# Patient Record
Sex: MALE | Race: WHITE | Employment: OTHER | URBAN - METROPOLITAN AREA
[De-identification: names, ages, dates, MRNs, and addresses within clinical notes are randomized per-mention and may not be internally consistent; named-entity substitution may affect disease eponyms.]

---

## 2018-02-16 PROBLEM — S13.100A CERVICAL SUBLUXATION: Status: ACTIVE | Noted: 2018-02-16

## 2020-08-25 ENCOUNTER — APPOINTMENT (OUTPATIENT)
Dept: GENERAL RADIOLOGY | Age: 70
End: 2020-08-25
Payer: MEDICARE

## 2020-08-25 ENCOUNTER — HOSPITAL ENCOUNTER (EMERGENCY)
Age: 70
Discharge: HOME | End: 2020-08-26
Attending: EMERGENCY MEDICINE
Payer: MEDICARE

## 2020-08-25 PROCEDURE — 6370000000 HC RX 637 (ALT 250 FOR IP): Performed by: PHYSICIAN ASSISTANT

## 2020-08-25 PROCEDURE — 73110 X-RAY EXAM OF WRIST: CPT

## 2020-08-25 PROCEDURE — 29125 APPL SHORT ARM SPLINT STATIC: CPT

## 2020-08-25 PROCEDURE — 99284 EMERGENCY DEPT VISIT MOD MDM: CPT

## 2020-08-25 RX ORDER — BUPIVACAINE HYDROCHLORIDE 2.5 MG/ML
10 INJECTION, SOLUTION EPIDURAL; INFILTRATION; INTRACAUDAL ONCE
Status: DISCONTINUED | OUTPATIENT
Start: 2020-08-25 | End: 2020-08-26 | Stop reason: HOSPADM

## 2020-08-25 RX ORDER — HYDROCODONE BITARTRATE AND ACETAMINOPHEN 5; 325 MG/1; MG/1
1 TABLET ORAL ONCE
Status: COMPLETED | OUTPATIENT
Start: 2020-08-25 | End: 2020-08-26

## 2020-08-25 RX ORDER — OXYCODONE HYDROCHLORIDE AND ACETAMINOPHEN 5; 325 MG/1; MG/1
1 TABLET ORAL EVERY 6 HOURS PRN
Qty: 14 TABLET | Refills: 0 | Status: SHIPPED | OUTPATIENT
Start: 2020-08-25 | End: 2020-08-26 | Stop reason: SDUPTHER

## 2020-08-25 RX ORDER — LIDOCAINE HYDROCHLORIDE 10 MG/ML
20 INJECTION, SOLUTION INFILTRATION; PERINEURAL ONCE
Status: DISCONTINUED | OUTPATIENT
Start: 2020-08-25 | End: 2020-08-26 | Stop reason: HOSPADM

## 2020-08-25 RX ORDER — HYDROCODONE BITARTRATE AND ACETAMINOPHEN 5; 325 MG/1; MG/1
1 TABLET ORAL ONCE
Status: COMPLETED | OUTPATIENT
Start: 2020-08-25 | End: 2020-08-25

## 2020-08-25 RX ADMIN — HYDROCODONE BITARTRATE AND ACETAMINOPHEN 1 TABLET: 5; 325 TABLET ORAL at 20:50

## 2020-08-25 ASSESSMENT — PAIN DESCRIPTION - ORIENTATION: ORIENTATION: LEFT

## 2020-08-25 ASSESSMENT — PAIN DESCRIPTION - LOCATION: LOCATION: ARM;WRIST

## 2020-08-25 ASSESSMENT — PAIN SCALES - GENERAL
PAINLEVEL_OUTOF10: 5
PAINLEVEL_OUTOF10: 8

## 2020-08-25 ASSESSMENT — PAIN DESCRIPTION - FREQUENCY: FREQUENCY: CONTINUOUS

## 2020-08-25 ASSESSMENT — PAIN DESCRIPTION - PAIN TYPE: TYPE: ACUTE PAIN

## 2020-08-26 VITALS
HEART RATE: 62 BPM | WEIGHT: 223 LBS | HEIGHT: 75 IN | TEMPERATURE: 98.1 F | BODY MASS INDEX: 27.73 KG/M2 | SYSTOLIC BLOOD PRESSURE: 116 MMHG | OXYGEN SATURATION: 95 % | RESPIRATION RATE: 16 BRPM | DIASTOLIC BLOOD PRESSURE: 69 MMHG

## 2020-08-26 PROCEDURE — 6370000000 HC RX 637 (ALT 250 FOR IP): Performed by: PHYSICIAN ASSISTANT

## 2020-08-26 PROCEDURE — 6370000000 HC RX 637 (ALT 250 FOR IP): Performed by: EMERGENCY MEDICINE

## 2020-08-26 RX ORDER — OXYCODONE HYDROCHLORIDE AND ACETAMINOPHEN 5; 325 MG/1; MG/1
1 TABLET ORAL EVERY 6 HOURS PRN
Qty: 14 TABLET | Refills: 0 | Status: SHIPPED | OUTPATIENT
Start: 2020-08-26 | End: 2020-08-31

## 2020-08-26 RX ORDER — OXYCODONE HYDROCHLORIDE AND ACETAMINOPHEN 5; 325 MG/1; MG/1
1 TABLET ORAL ONCE
Status: COMPLETED | OUTPATIENT
Start: 2020-08-26 | End: 2020-08-26

## 2020-08-26 RX ADMIN — HYDROCODONE BITARTRATE AND ACETAMINOPHEN 1 TABLET: 5; 325 TABLET ORAL at 00:58

## 2020-08-26 RX ADMIN — OXYCODONE HYDROCHLORIDE AND ACETAMINOPHEN 1 TABLET: 5; 325 TABLET ORAL at 07:00

## 2020-08-26 ASSESSMENT — PAIN SCALES - GENERAL
PAINLEVEL_OUTOF10: 6
PAINLEVEL_OUTOF10: 5
PAINLEVEL_OUTOF10: 5

## 2020-08-26 NOTE — ED PROVIDER NOTES
ED Attending  CC: No  HPI:  8/25/20, Time: 8:37 PM EDT         Mariah Hammonds is a 72 y.o. male presenting to the ED for left wrist injury, beginning prior to arrival .  The complaint has been persistent, moderate in severity, and worsened by the men of his wrist he was playing baseball when that he ran into another player, fell hitting his wrist with his head wrist hyperextending and he felt a snap at the wrist.  Patient with limited range of motion present. He denies  numbness tingling or weakness. No head injury no loss of consciousness no neck pain. Review of Systems:   Pertinent positives and negatives are stated within HPI, all other systems reviewed and are negative.          --------------------------------------------- PAST HISTORY ---------------------------------------------  Past Medical History:  has a past medical history of Blood transfusion, Chronic back pain, Hyperlipidemia, and Osteoarthritis. Past Surgical History:  has a past surgical history that includes knee surgery (back in 80 ); Heel spur surgery; Tonsillectomy; Knee Arthroplasty ([id number]); back surgery; Neck surgery; lumbar fusion; and Hand surgery. Social History:  reports that he has never smoked. He has never used smokeless tobacco. He reports current alcohol use of about 3.0 standard drinks of alcohol per week. Family History: family history includes Arthritis in his father and mother; Cancer in his mother. The patients home medications have been reviewed. Allergies: Tetanus toxoids    -------------------------------------------------- RESULTS -------------------------------------------------  All laboratory and radiology results have been personally reviewed by myself   LABS:  No results found for this visit on 08/25/20.     RADIOLOGY:  Interpreted by Radiologist.  XR WRIST LEFT (MIN 3 VIEWS)   Final Result   Improved in the alignment of the lung fragments of the   impacted fracture of the distal left radius following reduction. XR WRIST LEFT (MIN 3 VIEWS)   Final Result   Interval fracture reduction of the distal radius, with   significant residual displacement and dorsal angulation      XR WRIST LEFT (MIN 3 VIEWS)   Final Result      Comminuted fracture of the distal radius, posteriorly angulated, with   extension into the radioulnar joint and possibly radiocarpal joint.          ------------------------- NURSING NOTES AND VITALS REVIEWED ---------------------------   The nursing notes within the ED encounter and vital signs as below have been reviewed. /79   Pulse 96   Temp 98.3 °F (36.8 °C) (Oral)   Resp 16   Ht 6' 3\" (1.905 m)   Wt 223 lb (101.2 kg)   SpO2 97%   BMI 27.87 kg/m²   Oxygen Saturation Interpretation: Normal      ---------------------------------------------------PHYSICAL EXAM--------------------------------------      Constitutional/General: Alert and oriented x3, well appearing, non toxic in NAD  Head: Normocephalic and atraumatic  Eyes: PERRL, EOMI  Mouth: Oropharynx clear, handling secretions, no trismus  Neck: Supple, full ROM,   Pulmonary: Lungs clear to auscultation bilaterally, no wheezes, rales, or rhonchi. Not in respiratory distress  Cardiovascular:  Regular rate and rhythm, no murmurs, gallops, or rubs. 2+ distal pulses  Abdomen: Soft, non tender, non distended,   Extremities: Moves all extremities x 4. Warm and well perfused left wrist with tenderness medial laterally with deformity noted pulses normal cap refill less than 2 seconds there is no tenderness within the dorsal aspect of the hand.   Skin: warm and dry without rash no abrasion  Neurologic: GCS 15,  Psych: Normal Affect      ------------------------------ ED COURSE/MEDICAL DECISION MAKING----------------------  Medications   lidocaine 1 % injection 20 mL (has no administration in time range)   bupivacaine (PF) (MARCAINE) 0.25 % injection 25 mg (has no administration in time range)   HYDROcodone-acetaminophen numbness tingling was not improving while here in the emergency room. Counseling: The emergency provider has spoken with the patient and discussed todays results, in addition to providing specific details for the plan of care and counseling regarding the diagnosis and prognosis. Questions are answered at this time and they are agreeable with the plan.      --------------------------------- IMPRESSION AND DISPOSITION ---------------------------------    IMPRESSION  1. Closed fracture of distal end of right radius, unspecified fracture morphology, initial encounter        DISPOSITION  Disposition: Discharge to home  Patient condition is good      NOTE: This report was transcribed using voice recognition software.  Every effort was made to ensure accuracy; however, inadvertent computerized transcription errors may be present     Rich Santoyo [place]  08/27/20 1800

## 2020-08-26 NOTE — ED PROVIDER NOTES
-------------------------------------------------- RESULTS -------------------------------------------------  All laboratory and radiology results have been personally reviewed by myself   LABS:  No results found for this visit on 08/25/20. RADIOLOGY:  Interpreted by Radiologist.  XR WRIST LEFT (MIN 3 VIEWS)   Final Result   Improved in the alignment of the lung fragments of the   impacted fracture of the distal left radius following reduction. XR WRIST LEFT (MIN 3 VIEWS)   Final Result   Interval fracture reduction of the distal radius, with   significant residual displacement and dorsal angulation      XR WRIST LEFT (MIN 3 VIEWS)   Final Result      Comminuted fracture of the distal radius, posteriorly angulated, with   extension into the radioulnar joint and possibly radiocarpal joint.          ------------------------- NURSING NOTES AND VITALS REVIEWED ---------------------------   The nursing notes within the ED encounter and vital signs as below have been reviewed. /69   Pulse 62   Temp 98.1 °F (36.7 °C) (Oral)   Resp 16   Ht 6' 3\" (1.905 m)   Wt 223 lb (101.2 kg)   SpO2 95%   BMI 27.87 kg/m²   Oxygen Saturation Interpretation: Normal          ------------------------------ ED COURSE/MEDICAL DECISION MAKING----------------------  Medications   lidocaine 1 % injection 20 mL (has no administration in time range)   bupivacaine (PF) (MARCAINE) 0.25 % injection 25 mg (has no administration in time range)   oxyCODONE-acetaminophen (PERCOCET) 5-325 MG per tablet 1 tablet (has no administration in time range)   HYDROcodone-acetaminophen (NORCO) 5-325 MG per tablet 1 tablet (1 tablet Oral Given 8/25/20 2050)   HYDROcodone-acetaminophen (NORCO) 5-325 MG per tablet 1 tablet (1 tablet Oral Given 8/26/20 0058)         ED COURSE:       Medical Decision Making:    Patient was seen by me directly as well as with the ER physician assistant.   Patient did have reduction of his comminuted angulated distal radius fracture in the department after hematoma block. He had successful reduction of the distal radius fracture, performed by myself as well as well as resident physician (Dr. Maya Oneal). (Please see his note.)      However, patient did have transient numbness of the left fourth and fifth finger after the reduction procedure. Patient was observed here in department while awaiting orthopedic consult. I was able to speak to Dr. Shaun Cortes on-call for patient's orthopedist,Dr. Maryoj Ly. I did relay the information to him. However, this morning prior to discharge, patient was reassessed by me and his numbness has completely resolved. He feels like the sensation in his fourth and fifth angers are completely back to normal.  He has normal cap refill to all the fingers he has normal mobility and completely normal sensation has returned. Dr. Shaun Cortes states the people patient can follow-up with him in his office tomorrow. I will have the patient call today to schedule the appointment. Dr. Shaun Cortes is comfortable with this outpatient plan. patient is in a splint as well as sling and will be given Percocet at home for pain control as needed. Again, patient is neurovascularly intact with full sensation back to the fourth and fifth fingers on discharge. He will return to the ER for any new or worsening symptoms. Patient is calling his wife now for disposition home. He smiling and pleasant and pleased with the outpatient plan. Counseling: The emergency provider has spoken with the patient and discussed todays results, in addition to providing specific details for the plan of care and counseling regarding the diagnosis and prognosis. Questions are answered at this time and they are agreeable with the plan.      --------------------------------- IMPRESSION AND DISPOSITION ---------------------------------    IMPRESSION  1.  Closed fracture of distal end of right radius, unspecified fracture

## 2020-08-26 NOTE — PROCEDURES
Joint Reduction Procedure Note    Indication: fracture    Consent: The patient was counseled regarding the procedure, it's indications, risks, potential complications and alternatives and any questions were answered. Consent was obtained. Procedure: The pre-reduction exam showed distal perfusion & neurologic function to be normal. The patient was placed in the appropriate position. Anesthesia/pain control was obtained using a hematoma block of the affected area using 4.0 cc of 1% Lidocaine without epinephrine. Reduction of the left wrist was performed by traction and counter traction. Post reduction films were obtained and revealed satisfactory reduction. A post-reduction exam revealed distal perfusion to be normal and distal neurologic function to be impaired and so an appropriate consult was obtained. The affected area was immobilized with a sugar tong splint. The patient tolerated the procedure well.     Complications: neurologic compromise - paresthesias of the 4th and 5th digits of the left hand

## 2021-03-04 ENCOUNTER — IMMUNIZATION (OUTPATIENT)
Dept: PRIMARY CARE CLINIC | Age: 71
End: 2021-03-04
Payer: MEDICARE

## 2021-03-04 PROCEDURE — 0011A COVID-19, MODERNA VACCINE 100MCG/0.5ML DOSE: CPT | Performed by: PHYSICIAN ASSISTANT

## 2021-03-04 PROCEDURE — 91301 COVID-19, MODERNA VACCINE 100MCG/0.5ML DOSE: CPT | Performed by: PHYSICIAN ASSISTANT

## 2021-04-01 ENCOUNTER — IMMUNIZATION (OUTPATIENT)
Dept: PRIMARY CARE CLINIC | Age: 71
End: 2021-04-01
Payer: MEDICARE

## 2021-04-01 PROCEDURE — 91301 COVID-19, MODERNA VACCINE 100MCG/0.5ML DOSE: CPT | Performed by: PHYSICIAN ASSISTANT

## 2021-04-01 PROCEDURE — 0012A COVID-19, MODERNA VACCINE 100MCG/0.5ML DOSE: CPT | Performed by: PHYSICIAN ASSISTANT

## 2021-06-14 LAB
MEASLES IMMUNE (IGG): NORMAL
MUMPS AB IGG: NORMAL
RUBELLA ANTIBODY IGG: NORMAL

## 2021-06-15 LAB — VARICELLA-ZOSTER VIRUS AB, IGG: NORMAL

## 2021-09-08 LAB
SARS-COV-2: NOT DETECTED
SOURCE: NORMAL

## 2021-09-17 RX ORDER — PHENOL 1.4 %
1 AEROSOL, SPRAY (ML) MUCOUS MEMBRANE DAILY
COMMUNITY

## 2021-09-17 NOTE — PROGRESS NOTES
[facility] PRE-ADMISSION TESTING INSTRUCTIONS    The Preadmission Testing patient is instructed accordingly using the following criteria (check applicable):    ARRIVAL INSTRUCTIONS:  [x] Parking the day of Surgery is located in the Main Entrance lot. Upon entering the door, make an immediate right to the surgery reception desk    [x] Bring photo ID and insurance card    [] Bring in a copy of Living will or Durable Power of  papers. [x] Please be sure to arrange for responsible adult to provide transportation to and from the hospital    [x] Please arrange for responsible adult to be with you for the 24 hour period post procedure due to having anesthesia      GENERAL INSTRUCTIONS:    [x] Nothing by mouth after midnight, including gum, candy, mints or water    [x] You may brush your teeth, but do not swallow any water    [x] Take medications as instructed with 1-2 oz of water    [x] Stop herbal supplements and vitamins 5 days prior to procedure    [] Follow preop dosing of blood thinners per physician instructions    [] Take 1/2 dose of evening insulin, but no insulin after midnight    [] No oral diabetic medications after midnight    [] If diabetic and have low blood sugar or feel symptomatic, take 1-2oz apple juice only    [] Bring inhalers day of surgery    [] Bring C-PAP/ Bi-Pap day of surgery    [] Bring urine specimen day of surgery    [x] Shower or bath with soap, lather and rinse well, AM of Surgery, no lotion, powders or creams to surgical site    [] Follow bowel prep as instructed per surgeon    [] No tobacco products within 24 hours of surgery     [] No alcohol or illegal drug use within 24 hours of surgery.     [x] Jewelry, body piercing's, eyeglasses, contact lenses and dentures are not permitted into surgery (bring cases)      [] Please do not wear any nail polish, make up or hair products on the day of surgery    [x] You can expect a call the business day prior to procedure to notify you if your arrival time changes    [x] If you receive a survey after surgery we would greatly appreciate your comments    [] Parent/guardian of a minor must accompany their child and remain on the premises  the entire time they are under our care     [] Pediatric patients may bring favorite toy, blanket or comfort item with them    [] A caregiver or family member must remain with the patient during their stay if they are mentally handicapped, have dementia, disoriented or unable to use a call light or would be a safety concern if left unattended    [x] Please notify surgeon if you develop any illness between now and time of surgery (cold, cough, sore throat, fever, nausea, vomiting) or any signs of infections  including skin, wounds, and dental.    []  The Outpatient Pharmacy is available to fill your prescription here on your day of surgery, ask your preop nurse for details    [] Other instructions    EDUCATIONAL MATERIALS PROVIDED:    [] PAT Preoperative Education Packet/Booklet     [] Medication List    [] Transfusion bracelet applied with instructions    [] Shower with soap, lather and rinse well, and use CHG wipes provided the evening before surgery as instructed    [] Incentive spirometer with instructions

## 2021-09-21 ENCOUNTER — PREP FOR PROCEDURE (OUTPATIENT)
Dept: PODIATRY | Age: 71
End: 2021-09-21

## 2021-09-21 ENCOUNTER — ANESTHESIA EVENT (OUTPATIENT)
Dept: OPERATING ROOM | Age: 71
End: 2021-09-21
Payer: MEDICARE

## 2021-09-21 RX ORDER — SODIUM CHLORIDE 9 MG/ML
25 INJECTION, SOLUTION INTRAVENOUS PRN
Status: CANCELLED | OUTPATIENT
Start: 2021-09-21

## 2021-09-21 RX ORDER — SODIUM CHLORIDE 0.9 % (FLUSH) 0.9 %
10 SYRINGE (ML) INJECTION EVERY 12 HOURS SCHEDULED
Status: CANCELLED | OUTPATIENT
Start: 2021-09-21

## 2021-09-21 RX ORDER — SODIUM CHLORIDE 0.9 % (FLUSH) 0.9 %
10 SYRINGE (ML) INJECTION PRN
Status: CANCELLED | OUTPATIENT
Start: 2021-09-21

## 2021-09-22 ENCOUNTER — HOSPITAL ENCOUNTER (OUTPATIENT)
Dept: GENERAL RADIOLOGY | Age: 71
Setting detail: OUTPATIENT SURGERY
Discharge: HOME | End: 2021-09-24
Attending: PODIATRIST
Payer: MEDICARE

## 2021-09-22 ENCOUNTER — ANESTHESIA (OUTPATIENT)
Dept: OPERATING ROOM | Age: 71
End: 2021-09-22
Payer: MEDICARE

## 2021-09-22 ENCOUNTER — HOSPITAL ENCOUNTER (OUTPATIENT)
Age: 71
Setting detail: OUTPATIENT SURGERY
Discharge: HOME | End: 2021-09-22
Attending: PODIATRIST | Admitting: PODIATRIST
Payer: MEDICARE

## 2021-09-22 VITALS
BODY MASS INDEX: 27.85 KG/M2 | HEART RATE: 51 BPM | SYSTOLIC BLOOD PRESSURE: 119 MMHG | DIASTOLIC BLOOD PRESSURE: 68 MMHG | WEIGHT: 224 LBS | OXYGEN SATURATION: 95 % | HEIGHT: 75 IN | TEMPERATURE: 96.6 F | RESPIRATION RATE: 16 BRPM

## 2021-09-22 VITALS — SYSTOLIC BLOOD PRESSURE: 90 MMHG | TEMPERATURE: 97.2 F | OXYGEN SATURATION: 94 % | DIASTOLIC BLOOD PRESSURE: 54 MMHG

## 2021-09-22 DIAGNOSIS — R52 PAIN: ICD-10-CM

## 2021-09-22 LAB — METER GLUCOSE: 104 MG/DL (ref 74–99)

## 2021-09-22 PROCEDURE — 2580000003 HC RX 258

## 2021-09-22 PROCEDURE — 7100000010 HC PHASE II RECOVERY - FIRST 15 MIN: Performed by: PODIATRIST

## 2021-09-22 PROCEDURE — 3700000001 HC ADD 15 MINUTES (ANESTHESIA): Performed by: PODIATRIST

## 2021-09-22 PROCEDURE — 6360000002 HC RX W HCPCS

## 2021-09-22 PROCEDURE — 2709999900 HC NON-CHARGEABLE SUPPLY: Performed by: PODIATRIST

## 2021-09-22 PROCEDURE — 3600000003 HC SURGERY LEVEL 3 BASE: Performed by: PODIATRIST

## 2021-09-22 PROCEDURE — 6360000002 HC RX W HCPCS: Performed by: PODIATRIST

## 2021-09-22 PROCEDURE — 7100000011 HC PHASE II RECOVERY - ADDTL 15 MIN: Performed by: PODIATRIST

## 2021-09-22 PROCEDURE — 6370000000 HC RX 637 (ALT 250 FOR IP): Performed by: PODIATRIST

## 2021-09-22 PROCEDURE — 2500000003 HC RX 250 WO HCPCS

## 2021-09-22 PROCEDURE — 3700000000 HC ANESTHESIA ATTENDED CARE: Performed by: PODIATRIST

## 2021-09-22 PROCEDURE — 88304 TISSUE EXAM BY PATHOLOGIST: CPT

## 2021-09-22 PROCEDURE — 3600000013 HC SURGERY LEVEL 3 ADDTL 15MIN: Performed by: PODIATRIST

## 2021-09-22 PROCEDURE — 2500000003 HC RX 250 WO HCPCS: Performed by: PODIATRIST

## 2021-09-22 PROCEDURE — 82962 GLUCOSE BLOOD TEST: CPT

## 2021-09-22 PROCEDURE — 2720000010 HC SURG SUPPLY STERILE: Performed by: PODIATRIST

## 2021-09-22 RX ORDER — GLYCOPYRROLATE 1 MG/5 ML
SYRINGE (ML) INTRAVENOUS PRN
Status: DISCONTINUED | OUTPATIENT
Start: 2021-09-22 | End: 2021-09-22 | Stop reason: SDUPTHER

## 2021-09-22 RX ORDER — PROPOFOL 10 MG/ML
INJECTION, EMULSION INTRAVENOUS CONTINUOUS PRN
Status: DISCONTINUED | OUTPATIENT
Start: 2021-09-22 | End: 2021-09-22 | Stop reason: SDUPTHER

## 2021-09-22 RX ORDER — ONDANSETRON 2 MG/ML
INJECTION INTRAMUSCULAR; INTRAVENOUS PRN
Status: DISCONTINUED | OUTPATIENT
Start: 2021-09-22 | End: 2021-09-22 | Stop reason: SDUPTHER

## 2021-09-22 RX ORDER — SODIUM CHLORIDE 9 MG/ML
INJECTION, SOLUTION INTRAVENOUS CONTINUOUS PRN
Status: DISCONTINUED | OUTPATIENT
Start: 2021-09-22 | End: 2021-09-22 | Stop reason: SDUPTHER

## 2021-09-22 RX ORDER — FENTANYL CITRATE 50 UG/ML
25 INJECTION, SOLUTION INTRAMUSCULAR; INTRAVENOUS EVERY 5 MIN PRN
Status: DISCONTINUED | OUTPATIENT
Start: 2021-09-22 | End: 2021-09-22 | Stop reason: HOSPADM

## 2021-09-22 RX ORDER — KETAMINE HYDROCHLORIDE 10 MG/ML
INJECTION, SOLUTION INTRAMUSCULAR; INTRAVENOUS PRN
Status: DISCONTINUED | OUTPATIENT
Start: 2021-09-22 | End: 2021-09-22 | Stop reason: SDUPTHER

## 2021-09-22 RX ORDER — KETOROLAC TROMETHAMINE 30 MG/ML
INJECTION, SOLUTION INTRAMUSCULAR; INTRAVENOUS PRN
Status: DISCONTINUED | OUTPATIENT
Start: 2021-09-22 | End: 2021-09-22 | Stop reason: SDUPTHER

## 2021-09-22 RX ORDER — FENTANYL CITRATE 50 UG/ML
INJECTION, SOLUTION INTRAMUSCULAR; INTRAVENOUS PRN
Status: DISCONTINUED | OUTPATIENT
Start: 2021-09-22 | End: 2021-09-22 | Stop reason: SDUPTHER

## 2021-09-22 RX ORDER — GINSENG 100 MG
CAPSULE ORAL PRN
Status: DISCONTINUED | OUTPATIENT
Start: 2021-09-22 | End: 2021-09-22 | Stop reason: ALTCHOICE

## 2021-09-22 RX ORDER — SODIUM CHLORIDE 9 MG/ML
25 INJECTION, SOLUTION INTRAVENOUS PRN
Status: DISCONTINUED | OUTPATIENT
Start: 2021-09-22 | End: 2021-09-22 | Stop reason: HOSPADM

## 2021-09-22 RX ORDER — DEXAMETHASONE SODIUM PHOSPHATE 4 MG/ML
INJECTION, SOLUTION INTRA-ARTICULAR; INTRALESIONAL; INTRAMUSCULAR; INTRAVENOUS; SOFT TISSUE PRN
Status: DISCONTINUED | OUTPATIENT
Start: 2021-09-22 | End: 2021-09-22 | Stop reason: SDUPTHER

## 2021-09-22 RX ORDER — LIDOCAINE HYDROCHLORIDE AND EPINEPHRINE 20; 5 MG/ML; UG/ML
INJECTION, SOLUTION EPIDURAL; INFILTRATION; INTRACAUDAL; PERINEURAL PRN
Status: DISCONTINUED | OUTPATIENT
Start: 2021-09-22 | End: 2021-09-22 | Stop reason: ALTCHOICE

## 2021-09-22 RX ORDER — SODIUM CHLORIDE 0.9 % (FLUSH) 0.9 %
10 SYRINGE (ML) INJECTION EVERY 12 HOURS SCHEDULED
Status: DISCONTINUED | OUTPATIENT
Start: 2021-09-22 | End: 2021-09-22 | Stop reason: HOSPADM

## 2021-09-22 RX ORDER — MIDAZOLAM HYDROCHLORIDE 1 MG/ML
INJECTION INTRAMUSCULAR; INTRAVENOUS PRN
Status: DISCONTINUED | OUTPATIENT
Start: 2021-09-22 | End: 2021-09-22 | Stop reason: SDUPTHER

## 2021-09-22 RX ORDER — SODIUM CHLORIDE 0.9 % (FLUSH) 0.9 %
10 SYRINGE (ML) INJECTION PRN
Status: DISCONTINUED | OUTPATIENT
Start: 2021-09-22 | End: 2021-09-22 | Stop reason: HOSPADM

## 2021-09-22 RX ORDER — LIDOCAINE HYDROCHLORIDE 20 MG/ML
INJECTION, SOLUTION EPIDURAL; INFILTRATION; INTRACAUDAL; PERINEURAL PRN
Status: DISCONTINUED | OUTPATIENT
Start: 2021-09-22 | End: 2021-09-22 | Stop reason: ALTCHOICE

## 2021-09-22 RX ADMIN — Medication 0.1 MG: at 09:01

## 2021-09-22 RX ADMIN — SODIUM CHLORIDE: 9 INJECTION, SOLUTION INTRAVENOUS at 08:45

## 2021-09-22 RX ADMIN — FENTANYL CITRATE 25 MCG: 50 INJECTION, SOLUTION INTRAMUSCULAR; INTRAVENOUS at 09:55

## 2021-09-22 RX ADMIN — DEXAMETHASONE SODIUM PHOSPHATE 8 MG: 4 INJECTION, SOLUTION INTRAMUSCULAR; INTRAVENOUS at 09:01

## 2021-09-22 RX ADMIN — MIDAZOLAM 2 MG: 1 INJECTION INTRAMUSCULAR; INTRAVENOUS at 08:50

## 2021-09-22 RX ADMIN — Medication 2000 MG: at 09:03

## 2021-09-22 RX ADMIN — KETOROLAC TROMETHAMINE 30 MG: 30 INJECTION, SOLUTION INTRAMUSCULAR; INTRAVENOUS at 10:02

## 2021-09-22 RX ADMIN — KETAMINE HYDROCHLORIDE 10 MG: 10 INJECTION INTRAMUSCULAR; INTRAVENOUS at 09:38

## 2021-09-22 RX ADMIN — PROPOFOL 100 MCG/KG/MIN: 10 INJECTION, EMULSION INTRAVENOUS at 09:01

## 2021-09-22 RX ADMIN — ONDANSETRON 4 MG: 2 INJECTION INTRAMUSCULAR; INTRAVENOUS at 09:01

## 2021-09-22 RX ADMIN — KETAMINE HYDROCHLORIDE 20 MG: 10 INJECTION INTRAMUSCULAR; INTRAVENOUS at 09:01

## 2021-09-22 ASSESSMENT — PAIN DESCRIPTION - DESCRIPTORS: DESCRIPTORS: BURNING;THROBBING

## 2021-09-22 ASSESSMENT — LIFESTYLE VARIABLES: SMOKING_STATUS: 0

## 2021-09-22 ASSESSMENT — PAIN - FUNCTIONAL ASSESSMENT: PAIN_FUNCTIONAL_ASSESSMENT: 0-10

## 2021-09-22 ASSESSMENT — PAIN SCALES - GENERAL: PAINLEVEL_OUTOF10: 0

## 2021-09-22 NOTE — OP NOTE
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    Post-Op Diagnosis: Same       Procedure(s):  EXCISION HARKINS'S NEUROMA THIRD INNNERSPACE REDUCTION OF HAMMER TOE SECOND BY ARTHOPLASTY AND SOFT TISSUE RELEASE RIGHT FOOT    Surgeon(s):  RAYMOND Ferguson DPM    Assistant:   * No surgical staff found *    Anesthesia: Monitor Anesthesia Care    Estimated Blood Loss (mL): Minimal    Complications: None    Specimens:   ID Type Source Tests Collected by Time Destination   A : RIGHT FOOT NEUROMA Tissue Tissue SURGICAL PATHOLOGY Arthur Meneses DPM 9/22/2021 7488        Implants:        Drains: * No LDAs found *    Findings:    Detailed Description of Procedure:  On 9/22/2021, this 80year old male was transported from the hospital preoperative holding area to the operating room where he was placed on the operating table. Following induction of MAC anesthesia, the R foot was prepared and draped in the usual sterile fashion and the following procedure was performed:    Excision of Neuroma Third Interspace, R foot:  Attention was directed towards the dorsum of the R foot where a 3 cm dorsal linear skin incision was deepened down to the level of the subcutaneous tissues. All coursing venous tributaries were identified,isolated,clamped,cut, and electrocoagulated as encountered. All vital neurovascular structures were gently retracted in a medial and lateral fashion. At this time, utilizing hemostats, blunt dissection was carried down into the space between the third and fourth metatarsal heads and intermetatarsal space. Blunt dissection was continued deep into the third intermetatarsal space where a moderate amount of nerve tissue was found.  This portion of tissue was isolated via a blunt dissection and was freed proximally to the proximal margins of the incision as well as distally. The nerve was then transected at its most extreme proximal and distal exposure. Following this, the enlarged nerve was extirpated from the surgical site and sent to pathology. Following this, the site was flushed with copious amounts of sterile saline solution. The deep tissues were coapted utilizing 3-0 vicryl in a simple interrupted suture type fashion. The skin was recoapted using 4-0 Vicryl in a simple interrupted suture type fashion. Digital Arthroplasty 2nd Digit, R foot   A 4-cm incision was made over the proximal interphalangeal joint. The incision was deepened to the level of the extensor tendon and capsule surrounding the PIPJ. Care was taken to preserve all neurovascular structures and cauterize all bleeders. An additional 2 cm incision was made proximal to the first incision for performance of the capsulotomy and extensor tenotomy. All soft tissue attachments to the base of the proximal phalanx were released including collateral ligaments and extensor digitorum longus tendon. Next, a bone rasp was used to smooth any sharp edges of the proximal phalanx and distal phalanx. Then, a sagittal saw was used to resect the base of the proximal phalanx and consequently the distal phalanx. The extensor tendon was reapproximated using 3-0 Vicryl in a simple interrupted type fashion. The subcutaneous tissue was reapproximated utilizing 3-0 Vicryl in a simple interrupted type fashion and skin was reapproximated utilizing 4-0 Nylon in a simple interrupted type fashion. Incision was cleaned with saline and dried. The second digit was noted to be significantly reduced to the level of contraction deformity. Adaptic was placed on surgical incision and dressed with Kerlix, gauze and ACE. Patient left in surgical shoe.     Electronically signed by Digna Mckeon DPM on 9/22/2021 at 10:32 AM

## 2021-09-22 NOTE — ANESTHESIA POSTPROCEDURE EVALUATION
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        Anesthesia Post Evaluation    Patient location during evaluation: PACU  Level of consciousness: awake  Airway patency: patent  Nausea & Vomiting: no nausea and no vomiting  Complications: no  Cardiovascular status: hemodynamically stable  Respiratory status: acceptable

## 2021-09-22 NOTE — ANESTHESIA PRE PROCEDURE
Department of Anesthesiology  Preprocedure Note       Name:  Juanis Jones   Age:  80 y.o.  :  1950                                          MRN:  [id number]         Date:  2021      Surgeon: Dilan Curry):  Joseph Finn DPM    Procedure: Procedure(s):  EXCISION HARKINS'S NEUROMA THIRD INNNERSPACE REDUCTION OF HAMMER TOE SECOND BY ARTHOPLASTY AND SOFT TISSUE RELEASE RIGHT FOOT    Medications prior to admission:   Prior to Admission medications    Medication Sig Start Date End Date Taking? Authorizing Provider   Diclofenac-miSOPROStol (ARTHROTEC PO) Take by mouth   Yes Historical Provider, MD   Resveratrol 250 MG CAPS Take 1 capsule by mouth daily   Yes Historical Provider, MD       Current medications:    Current Facility-Administered Medications   Medication Dose Route Frequency Provider Last Rate Last Admin    0.9 % sodium chloride infusion  25 mL IntraVENous PRN Joseph Finn DPM        ceFAZolin (ANCEF) 2000 mg in sterile water 20 mL IV syringe  2,000 mg IntraVENous On Call to [address], [place]        sodium chloride flush 0.9 % injection 10 mL  10 mL IntraVENous 2 times per day Joseph Finn DPM        sodium chloride flush 0.9 % injection 10 mL  10 mL IntraVENous PRN Joseph Finn DPM           Allergies:     Allergies   Allergen Reactions    Tetanus Toxoids      Only one time had a problem has had it many time before and after         Problem List:    Patient Active Problem List   Diagnosis Code    S/P total knee replacement using cement Z96.659    Arthritis of right foot M19.071    Cervical subluxation S13.100A       Past Medical History:        Diagnosis Date    Chronic back pain     Hyperlipidemia     High but not on medication    Osteoarthritis        Past Surgical History:        Procedure Laterality Date    BACK SURGERY      HAND SURGERY      HEEL SPUR SURGERY      bone spur on top of foot    KNEE ARTHROPLASTY  [id number]    RIGHT TOTAL    KNEE SURGERY  back in 71     had many surgery on knee replacement 2004    LUMBAR FUSION      NECK SURGERY      TONSILLECTOMY         Social History:    Social History     Tobacco Use    Smoking status: Never Smoker    Smokeless tobacco: Never Used   Substance Use Topics    Alcohol use: Yes     Alcohol/week: 3.0 standard drinks     Types: 3 Cans of beer per week                                Counseling given: Not Answered      Vital Signs (Current):   Vitals:    09/17/21 1527 09/22/21 0722   BP:  120/71   Pulse:  62   Resp:  20   Temp:  97.1 °F (36.2 °C)   TempSrc:  Temporal   SpO2:  95%   Weight: 224 lb (101.6 kg) 224 lb (101.6 kg)   Height: 6' 3\" (1.905 m) 6' 3\" (1.905 m)                                              BP Readings from Last 3 Encounters:   09/22/21 120/71   08/26/20 116/69   03/09/18 (!) 140/60       NPO Status: Time of last liquid consumption: 1930                        Time of last solid consumption: 1930                        Date of last liquid consumption: 09/21/21                        Date of last solid food consumption: 09/21/21    BMI:   Wt Readings from Last 3 Encounters:   09/22/21 224 lb (101.6 kg)   08/25/20 223 lb (101.2 kg)   03/09/18 225 lb (102.1 kg)     Body mass index is 28 kg/m². CBC:   Lab Results   Component Value Date    WBC 4.7 04/05/2012    RBC 4.38 04/05/2012    HGB 13.5 04/05/2012    HCT 39.5 04/05/2012    MCV 90.3 04/05/2012    RDW 13.3 04/05/2012     07/12/2013       CMP:   Lab Results   Component Value Date     01/15/2012    K 4.1 01/15/2012     01/15/2012    CO2 33 01/15/2012    BUN 13 01/15/2012    CREATININE 0.8 01/15/2012    LABGLOM >60 01/15/2012    GLUCOSE 123 01/15/2012    PROT 6.4 01/11/2012    CALCIUM 8.4 01/15/2012    BILITOT 1.2 01/11/2012    ALKPHOS 81 01/11/2012    AST 21 01/11/2012    ALT 21 01/11/2012       POC Tests: No results for input(s): POCGLU, POCNA, POCK, POCCL, POCBUN, POCHEMO, POCHCT in the last 72 hours.     Coags:   Lab Results   Component Value Date    PROTIME 10.9 07/12/2013    INR 0.9 07/12/2013    APTT 29.0 07/12/2013       HCG (If Applicable): No results found for: PREGTESTUR, PREGSERUM, HCG, HCGQUANT     ABGs: No results found for: PHART, PO2ART, NHP2GPO, VXU9RSL, BEART, [id number]     Type & Screen (If Applicable):  No results found for: LABABO, LABRH    Drug/Infectious Status (If Applicable):  No results found for: HIV, HEPCAB    COVID-19 Screening (If Applicable):   Lab Results   Component Value Date    COVID19 Not Detected 09/07/2021           Anesthesia Evaluation  Patient summary reviewed history of anesthetic complications (Slow emergence after tonsillectomy at age 16.): Airway: Mallampati: III  TM distance: >3 FB   Neck ROM: full  Mouth opening: > = 3 FB Dental:      Comment: Discolored, chipped teeth. Pulmonary:Negative Pulmonary ROS breath sounds clear to auscultation      (-) not a current smoker                           Cardiovascular:Negative CV ROS            Rhythm: regular  Rate: normal                    Neuro/Psych:                ROS comment: S/P lumbar and cervical surgeries. Numbness--fingers--right hand. GI/Hepatic/Renal: Neg GI/Hepatic/Renal ROS            Endo/Other: Negative Endo/Other ROS                    Abdominal:             Vascular: negative vascular ROS. Other Findings:           Anesthesia Plan      MAC and general     ASA 2     (Pt agrees to [facility] ATHENS and IV sedation. He consents to GA if necessary.)  Induction: intravenous. Anesthetic plan and risks discussed with patient. Plan discussed with CRNA. Dora Correa MD   9/22/2021    DOS STAFF ADDENDUM:    Pt seen and examined, physical exam updated, chart reviewed including anesthesia, drug and allergy history. H&P reviewed. No interval changes to history or physical examination (unless noted above). NPO status confirmed. Anesthetic plan, risks, benefits, alternatives discussed with patient. Patient verbalized an understanding and agrees to proceed.      Katelyn Gray MD  Staff Anesthesiologist  8:53 AM

## 2021-09-22 NOTE — PROGRESS NOTES
Went over discharge instructions with patient and wife. Both verbalized understanding of instructions. Doctor explained to wife that he was on the phone trying to fix pain prescriptions. Instructed patient and wife to call pharmacy to see if prescription was fixed. If not call the office to make sure the prescription is fixed.

## 2023-03-22 ENCOUNTER — HOSPITAL ENCOUNTER (OUTPATIENT)
Age: 73
Setting detail: OBSERVATION
Discharge: HOME | End: 2023-03-23
Attending: STUDENT IN AN ORGANIZED HEALTH CARE EDUCATION/TRAINING PROGRAM | Admitting: FAMILY MEDICINE
Payer: MEDICARE

## 2023-03-22 ENCOUNTER — APPOINTMENT (OUTPATIENT)
Dept: CT IMAGING | Age: 73
End: 2023-03-22
Payer: MEDICARE

## 2023-03-22 ENCOUNTER — APPOINTMENT (OUTPATIENT)
Dept: GENERAL RADIOLOGY | Age: 73
End: 2023-03-22
Payer: MEDICARE

## 2023-03-22 DIAGNOSIS — R07.9 CHEST PAIN, UNSPECIFIED TYPE: Primary | ICD-10-CM

## 2023-03-22 DIAGNOSIS — K52.9 ENTERITIS: ICD-10-CM

## 2023-03-22 DIAGNOSIS — R11.2 NAUSEA AND VOMITING, UNSPECIFIED VOMITING TYPE: ICD-10-CM

## 2023-03-22 PROBLEM — E86.0 DEHYDRATION: Status: ACTIVE | Noted: 2023-03-22

## 2023-03-22 LAB
ALBUMIN SERPL-MCNC: 4 G/DL (ref 3.5–5.2)
ALP SERPL-CCNC: 103 U/L (ref 40–129)
ALT SERPL-CCNC: 18 U/L (ref 0–40)
ANION GAP SERPL CALCULATED.3IONS-SCNC: 13 MMOL/L (ref 7–16)
AST SERPL-CCNC: 18 U/L (ref 0–39)
BASOPHILS # BLD: 0 E9/L (ref 0–0.2)
BASOPHILS NFR BLD: 0 % (ref 0–2)
BILIRUB SERPL-MCNC: 0.6 MG/DL (ref 0–1.2)
BUN SERPL-MCNC: 30 MG/DL (ref 6–23)
CALCIUM SERPL-MCNC: 8.7 MG/DL (ref 8.6–10.2)
CHLORIDE SERPL-SCNC: 102 MMOL/L (ref 98–107)
CO2 SERPL-SCNC: 23 MMOL/L (ref 22–29)
CREAT SERPL-MCNC: 1 MG/DL (ref 0.7–1.2)
D DIMER: 254 NG/ML DDU
EKG ATRIAL RATE: 110 BPM
EKG P AXIS: 0 DEGREES
EKG P-R INTERVAL: 200 MS
EKG Q-T INTERVAL: 332 MS
EKG QRS DURATION: 106 MS
EKG QTC CALCULATION (BAZETT): 449 MS
EKG R AXIS: -36 DEGREES
EKG T AXIS: 78 DEGREES
EKG VENTRICULAR RATE: 110 BPM
EOSINOPHIL # BLD: 0 E9/L (ref 0.05–0.5)
EOSINOPHIL NFR BLD: 0 % (ref 0–6)
ERYTHROCYTE [DISTWIDTH] IN BLOOD BY AUTOMATED COUNT: 13.1 FL (ref 11.5–15)
GLUCOSE SERPL-MCNC: 137 MG/DL (ref 74–99)
HCT VFR BLD AUTO: 46.3 % (ref 37–54)
HGB BLD-MCNC: 15.6 G/DL (ref 12.5–16.5)
INFLUENZA A BY PCR: NOT DETECTED
INFLUENZA B BY PCR: NOT DETECTED
LACTATE BLDV-SCNC: 1.7 MMOL/L (ref 0.5–2.2)
LACTATE BLDV-SCNC: 3.3 MMOL/L (ref 0.5–2.2)
LIPASE: 34 U/L (ref 13–60)
LYMPHOCYTES # BLD: 0.27 E9/L (ref 1.5–4)
LYMPHOCYTES NFR BLD: 1.8 % (ref 20–42)
MAGNESIUM SERPL-MCNC: 1.8 MG/DL (ref 1.6–2.6)
MCH RBC QN AUTO: 31.1 PG (ref 26–35)
MCHC RBC AUTO-ENTMCNC: 33.7 % (ref 32–34.5)
MCV RBC AUTO: 92.2 FL (ref 80–99.9)
MONOCYTES # BLD: 0.34 E9/L (ref 0.1–0.95)
MONOCYTES NFR BLD: 5.3 % (ref 2–12)
NEUTROPHILS # BLD: 6.03 E9/L (ref 1.8–7.3)
NEUTS SEG NFR BLD: 90.3 % (ref 43–80)
NRBC BLD-RTO: 0 /100 WBC
PLATELET # BLD AUTO: 223 E9/L (ref 130–450)
PMV BLD AUTO: 9.6 FL (ref 7–12)
POTASSIUM SERPL-SCNC: 4 MMOL/L (ref 3.5–5)
PROT SERPL-MCNC: 7 G/DL (ref 6.4–8.3)
RBC # BLD AUTO: 5.02 E12/L (ref 3.8–5.8)
RBC MORPH BLD: NORMAL
SARS-COV-2 RDRP RESP QL NAA+PROBE: NOT DETECTED
SODIUM SERPL-SCNC: 138 MMOL/L (ref 132–146)
TROPONIN, HIGH SENSITIVITY: 16 NG/L (ref 0–11)
TROPONIN, HIGH SENSITIVITY: 18 NG/L (ref 0–11)
TROPONIN, HIGH SENSITIVITY: 22 NG/L (ref 0–11)
VARIANT LYMPHS NFR BLD: 2.6 % (ref 0–4)
WBC # BLD: 6.7 E9/L (ref 4.5–11.5)

## 2023-03-22 PROCEDURE — 71275 CT ANGIOGRAPHY CHEST: CPT

## 2023-03-22 PROCEDURE — 83690 ASSAY OF LIPASE: CPT

## 2023-03-22 PROCEDURE — 93005 ELECTROCARDIOGRAM TRACING: CPT | Performed by: STUDENT IN AN ORGANIZED HEALTH CARE EDUCATION/TRAINING PROGRAM

## 2023-03-22 PROCEDURE — 2580000003 HC RX 258: Performed by: STUDENT IN AN ORGANIZED HEALTH CARE EDUCATION/TRAINING PROGRAM

## 2023-03-22 PROCEDURE — 2580000003 HC RX 258: Performed by: FAMILY MEDICINE

## 2023-03-22 PROCEDURE — 80053 COMPREHEN METABOLIC PANEL: CPT

## 2023-03-22 PROCEDURE — 93005 ELECTROCARDIOGRAM TRACING: CPT | Performed by: PHYSICIAN ASSISTANT

## 2023-03-22 PROCEDURE — 6370000000 HC RX 637 (ALT 250 FOR IP): Performed by: STUDENT IN AN ORGANIZED HEALTH CARE EDUCATION/TRAINING PROGRAM

## 2023-03-22 PROCEDURE — 6360000002 HC RX W HCPCS: Performed by: STUDENT IN AN ORGANIZED HEALTH CARE EDUCATION/TRAINING PROGRAM

## 2023-03-22 PROCEDURE — 6360000004 HC RX CONTRAST MEDICATION: Performed by: RADIOLOGY

## 2023-03-22 PROCEDURE — 71045 X-RAY EXAM CHEST 1 VIEW: CPT

## 2023-03-22 PROCEDURE — 99285 EMERGENCY DEPT VISIT HI MDM: CPT

## 2023-03-22 PROCEDURE — 96361 HYDRATE IV INFUSION ADD-ON: CPT

## 2023-03-22 PROCEDURE — 83605 ASSAY OF LACTIC ACID: CPT

## 2023-03-22 PROCEDURE — 87502 INFLUENZA DNA AMP PROBE: CPT

## 2023-03-22 PROCEDURE — 83735 ASSAY OF MAGNESIUM: CPT

## 2023-03-22 PROCEDURE — G0378 HOSPITAL OBSERVATION PER HR: HCPCS

## 2023-03-22 PROCEDURE — 87635 SARS-COV-2 COVID-19 AMP PRB: CPT

## 2023-03-22 PROCEDURE — 85025 COMPLETE CBC W/AUTO DIFF WBC: CPT

## 2023-03-22 PROCEDURE — 84484 ASSAY OF TROPONIN QUANT: CPT

## 2023-03-22 PROCEDURE — 93010 ELECTROCARDIOGRAM REPORT: CPT | Performed by: INTERNAL MEDICINE

## 2023-03-22 PROCEDURE — 96375 TX/PRO/DX INJ NEW DRUG ADDON: CPT

## 2023-03-22 PROCEDURE — 96365 THER/PROPH/DIAG IV INF INIT: CPT

## 2023-03-22 PROCEDURE — 36415 COLL VENOUS BLD VENIPUNCTURE: CPT

## 2023-03-22 PROCEDURE — 74177 CT ABD & PELVIS W/CONTRAST: CPT

## 2023-03-22 PROCEDURE — 85378 FIBRIN DEGRADE SEMIQUANT: CPT

## 2023-03-22 RX ORDER — 0.9 % SODIUM CHLORIDE 0.9 %
1000 INTRAVENOUS SOLUTION INTRAVENOUS ONCE
Status: COMPLETED | OUTPATIENT
Start: 2023-03-22 | End: 2023-03-22

## 2023-03-22 RX ORDER — SODIUM CHLORIDE 9 MG/ML
INJECTION, SOLUTION INTRAVENOUS CONTINUOUS
Status: DISCONTINUED | OUTPATIENT
Start: 2023-03-22 | End: 2023-03-23 | Stop reason: HOSPADM

## 2023-03-22 RX ORDER — MAGNESIUM SULFATE IN WATER 40 MG/ML
2000 INJECTION, SOLUTION INTRAVENOUS ONCE
Status: COMPLETED | OUTPATIENT
Start: 2023-03-22 | End: 2023-03-22

## 2023-03-22 RX ORDER — ASPIRIN 81 MG/1
324 TABLET, CHEWABLE ORAL ONCE
Status: COMPLETED | OUTPATIENT
Start: 2023-03-22 | End: 2023-03-22

## 2023-03-22 RX ORDER — ASPIRIN 81 MG/1
81 TABLET, CHEWABLE ORAL DAILY
Status: DISCONTINUED | OUTPATIENT
Start: 2023-03-22 | End: 2023-03-23 | Stop reason: HOSPADM

## 2023-03-22 RX ORDER — ONDANSETRON 2 MG/ML
4 INJECTION INTRAMUSCULAR; INTRAVENOUS ONCE
Status: COMPLETED | OUTPATIENT
Start: 2023-03-22 | End: 2023-03-22

## 2023-03-22 RX ADMIN — ASPIRIN 81 MG CHEWABLE TABLET 324 MG: 81 TABLET CHEWABLE at 15:59

## 2023-03-22 RX ADMIN — IOPAMIDOL 75 ML: 755 INJECTION, SOLUTION INTRAVENOUS at 16:52

## 2023-03-22 RX ADMIN — ONDANSETRON HYDROCHLORIDE 4 MG: 2 SOLUTION INTRAMUSCULAR; INTRAVENOUS at 16:06

## 2023-03-22 RX ADMIN — SODIUM CHLORIDE 1000 ML: 9 INJECTION, SOLUTION INTRAVENOUS at 16:07

## 2023-03-22 RX ADMIN — SODIUM CHLORIDE: 9 INJECTION, SOLUTION INTRAVENOUS at 20:19

## 2023-03-22 RX ADMIN — MAGNESIUM SULFATE HEPTAHYDRATE 2000 MG: 40 INJECTION, SOLUTION INTRAVENOUS at 16:10

## 2023-03-22 ASSESSMENT — LIFESTYLE VARIABLES
HOW OFTEN DO YOU HAVE A DRINK CONTAINING ALCOHOL: NEVER
HOW MANY STANDARD DRINKS CONTAINING ALCOHOL DO YOU HAVE ON A TYPICAL DAY: PATIENT DOES NOT DRINK

## 2023-03-22 NOTE — PROGRESS NOTES
Database initiated pharmacy and medications verified with the patient. He is A&O from home with wife. He uses no assistive devices and is RA at baseline.

## 2023-03-22 NOTE — ED PROVIDER NOTES
were made to edit the dictations but occasionally words are mis-transcribed.)    Gino Torres DO (electronically signed)           Zo Talamantes DO  Resident  03/22/23 1588

## 2023-03-22 NOTE — ED NOTES
SBAR faxed. Called and confirmed SBAR was received with Álvaro Bellos.       Dylan Day RN  03/22/23 5630

## 2023-03-23 VITALS
WEIGHT: 212.1 LBS | SYSTOLIC BLOOD PRESSURE: 100 MMHG | RESPIRATION RATE: 16 BRPM | DIASTOLIC BLOOD PRESSURE: 59 MMHG | HEART RATE: 79 BPM | BODY MASS INDEX: 26.37 KG/M2 | HEIGHT: 75 IN | OXYGEN SATURATION: 94 % | TEMPERATURE: 98.1 F

## 2023-03-23 PROBLEM — R79.89 ELEVATED TROPONIN: Status: ACTIVE | Noted: 2023-03-23

## 2023-03-23 PROBLEM — R77.8 ELEVATED TROPONIN: Status: ACTIVE | Noted: 2023-03-23

## 2023-03-23 LAB
ANION GAP SERPL CALCULATED.3IONS-SCNC: 8 MMOL/L (ref 7–16)
BUN SERPL-MCNC: 21 MG/DL (ref 6–23)
CALCIUM SERPL-MCNC: 7.9 MG/DL (ref 8.6–10.2)
CHLORIDE SERPL-SCNC: 103 MMOL/L (ref 98–107)
CHOLESTEROL, TOTAL: 157 MG/DL (ref 0–199)
CO2 SERPL-SCNC: 25 MMOL/L (ref 22–29)
CREAT SERPL-MCNC: 0.9 MG/DL (ref 0.7–1.2)
EKG ATRIAL RATE: 73 BPM
EKG Q-T INTERVAL: 314 MS
EKG QRS DURATION: 102 MS
EKG QTC CALCULATION (BAZETT): 483 MS
EKG R AXIS: -64 DEGREES
EKG T AXIS: 100 DEGREES
EKG VENTRICULAR RATE: 142 BPM
GLUCOSE SERPL-MCNC: 120 MG/DL (ref 74–99)
HDLC SERPL-MCNC: 30 MG/DL
LDLC SERPL CALC-MCNC: 93 MG/DL (ref 0–99)
LV EF: 58 %
LVEF MODALITY: NORMAL
POTASSIUM SERPL-SCNC: 3.8 MMOL/L (ref 3.5–5)
SODIUM SERPL-SCNC: 136 MMOL/L (ref 132–146)
TRIGL SERPL-MCNC: 169 MG/DL (ref 0–149)
TROPONIN, HIGH SENSITIVITY: 22 NG/L (ref 0–11)
TROPONIN, HIGH SENSITIVITY: 23 NG/L (ref 0–11)
TROPONIN, HIGH SENSITIVITY: 25 NG/L (ref 0–11)
VLDLC SERPL CALC-MCNC: 34 MG/DL

## 2023-03-23 PROCEDURE — 36415 COLL VENOUS BLD VENIPUNCTURE: CPT

## 2023-03-23 PROCEDURE — 96361 HYDRATE IV INFUSION ADD-ON: CPT

## 2023-03-23 PROCEDURE — APPSS60 APP SPLIT SHARED TIME 46-60 MINUTES: Performed by: CLINICAL NURSE SPECIALIST

## 2023-03-23 PROCEDURE — 84484 ASSAY OF TROPONIN QUANT: CPT

## 2023-03-23 PROCEDURE — G0378 HOSPITAL OBSERVATION PER HR: HCPCS

## 2023-03-23 PROCEDURE — 99222 1ST HOSP IP/OBS MODERATE 55: CPT | Performed by: INTERNAL MEDICINE

## 2023-03-23 PROCEDURE — 80048 BASIC METABOLIC PNL TOTAL CA: CPT

## 2023-03-23 PROCEDURE — 93306 TTE W/DOPPLER COMPLETE: CPT

## 2023-03-23 PROCEDURE — 80061 LIPID PANEL: CPT

## 2023-03-23 PROCEDURE — 6370000000 HC RX 637 (ALT 250 FOR IP): Performed by: FAMILY MEDICINE

## 2023-03-23 RX ORDER — ASPIRIN 81 MG/1
81 TABLET, CHEWABLE ORAL DAILY
Qty: 30 TABLET | Refills: 3 | Status: SHIPPED | OUTPATIENT
Start: 2023-03-24

## 2023-03-23 RX ADMIN — ASPIRIN 81 MG CHEWABLE TABLET 81 MG: 81 TABLET CHEWABLE at 08:46

## 2023-03-23 ASSESSMENT — PAIN SCALES - GENERAL: PAINLEVEL_OUTOF10: 0

## 2023-03-23 NOTE — CARE COORDINATION
Case Management Assessment  Initial Evaluation    Mr. Branden Bejarano was admitted due to chest pain, nausea, vomiting. Social service met with Ariella Alford & his supportive wife Sandra Lomas, advised them about social service &  roles, as well as discussed discharge planning. Ariella Alford resides in a one floor home with 3-entry steps. 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 Ariella Alford has no durable medical equipment and has used [facility] after knee surgery in the past.  Ariella Alford has no present discharge needs. Date/Time of Evaluation: 3/23/2023 11:42 AM  Assessment Completed by: JAYCE Gao    If patient is discharged prior to next notation, then this note serves as note for discharge by case management. Patient Name: Army Tang                   YOB: 1950  Diagnosis: Chest pain [R07.9]  Chest pain, unspecified type [R07.9]                   Date / Time: 3/22/2023  2:38 PM    Patient Admission Status: Observation   Readmission Risk (Low < 19, Mod (19-27), High > 27): No data recorded  Current PCP: Cedric Stuart, DO  PCP verified by ? Yes    Chart Reviewed: Yes      History Provided by: Patient  Patient Orientation: Alert and Oriented, Person, Place, Situation    Patient Cognition: Alert    Hospitalization in the last 30 days (Readmission):  No    If yes, Readmission Assessment in  Navigator will be completed. Advance Directives:      Code Status: Prior   Patient's Primary Decision Maker is: Legal Next of Kin      Discharge Planning:    Patient lives with: Spouse/Significant Other Type of Home: House  Primary Care Giver: Self  Patient Support Systems include: Spouse/Significant Other, Children   Current Financial resources:    Current community resources:    Current services prior to admission: None            Current DME:              Type of Home Care services:  None    ADLS  Prior functional level:  Independent in ADLs/IADLs  Current functional level:

## 2023-03-23 NOTE — PROGRESS NOTES
Admit Date: 3/22/2023  Hospital day 2    Subjective:     Patient resting in NAD. Feeling well. CP has resolved. Denies SOB, abd pain. Min nausea and vomiting has resolved. Objective:       I/O last 3 completed shifts:   In: 1050 [IV Piggyback:1050]  Out: 300 [Urine:300]      BP (!) 109/59   Pulse 80   Temp 98 °F (36.7 °C) (Oral)   Resp 16   Ht 6' 3\" (1.905 m)   Wt 212 lb 1.6 oz (96.2 kg)   SpO2 96%   BMI 26.51 kg/m²   General appearance: alert, appears stated age, cooperative, and no distress  Lungs: clear to auscultation bilaterally  Heart: regular rate and rhythm, S1, S2 normal, no murmur, click, rub or gallop  Abdomen: soft, non-tender; bowel sounds normal; no masses,  no organomegaly  Extremities: extremities normal, atraumatic, no cyanosis or edema  Skin: Skin color, texture, turgor normal. No rashes or lesions       Data ReviewCBC:       Recent Results (from the past 24 hour(s))   EKG 12 Lead    Collection Time: 03/22/23  1:08 PM   Result Value Ref Range    Ventricular Rate 110 BPM    Atrial Rate 110 BPM    P-R Interval 200 ms    QRS Duration 106 ms    Q-T Interval 332 ms    QTc Calculation (Bazett) 449 ms    P Axis 0 degrees    R Axis -36 degrees    T Axis 78 degrees   Lactic Acid    Collection Time: 03/22/23  3:04 PM   Result Value Ref Range    Lactic Acid 3.3 (H) 0.5 - 2.2 mmol/L   Lipase    Collection Time: 03/22/23  3:07 PM   Result Value Ref Range    Lipase 34 13 - 60 U/L   CBC with Auto Differential    Collection Time: 03/22/23  3:07 PM   Result Value Ref Range    WBC 6.7 4.5 - 11.5 E9/L    RBC 5.02 3.80 - 5.80 E12/L    Hemoglobin 15.6 12.5 - 16.5 g/dL    Hematocrit 46.3 37.0 - 54.0 %    MCV 92.2 80.0 - 99.9 fL    MCH 31.1 26.0 - 35.0 pg    MCHC 33.7 32.0 - 34.5 %    RDW 13.1 11.5 - 15.0 fL    Platelets 989 019 - 408 E9/L    MPV 9.6 7.0 - 12.0 fL    Neutrophils % 90.3 (H) 43.0 - 80.0 %    Lymphocytes % 1.8 (L) 20.0 - 42.0 %    Monocytes % 5.3 2.0 - 12.0 %    Eosinophils % 0.0 0.0 - 6.0 %

## 2023-03-23 NOTE — PLAN OF CARE
Problem: Cardiovascular - Adult  Goal: Maintains optimal cardiac output and hemodynamic stability  Outcome: Progressing     Problem: Pain  Goal: Verbalizes/displays adequate comfort level or baseline comfort level  Outcome: Progressing

## 2023-03-23 NOTE — H&P
evidence of pulmonary  embolism or acute pulmonary abnormality. The patient also had CT scan  of the abdomen performed, which showed a mildly dilated and fluid-filled  loops of small bowel suspicious for ileus versus early enteritis. Enlarged prostate gland was incidentally found as well as a questionable  gallstone. The patient was screened for influenza and COVID and both  were resulted as negative. The patient was given IV fluids and a repeat  troponin was slightly higher at 18; however, his lactic acid did decline  to 1.7. Vital signs remained very stable throughout his emergency room  stay and in actuality, his blood pressure did drop from 151/94 to 98/68  with pulse dropping from 116 to 82. However, with the patient's  left-sided chest pain, it was felt that he should be admitted for  further evaluation and treatment. PAST MEDICAL HISTORY:  Consists of chronic back pain, diffuse  osteoarthritis, hyperlipidemia. PAST SURGICAL HISTORY:  Back surgery, repair of right hammertoe, hand  surgery, heel spur surgery, right total knee arthroplasty, lumbar  fusion, neck surgery, and tonsillectomy. MEDICATIONS:  The patient was taking an anti-inflammatory p.r.n. and an  antioxidant. No other prescription medicines. ALLERGIES:  TETANUS TOXOID. No other allergies noted. SOCIAL HISTORY:  No recreational drug or cigarette abuse noted. The  patient does drink beer somewhat _____. REVIEW OF SYSTEMS:  Negative unless stated in above history including no  recent sign of upper respiratory infection such as fever, chills, cough,  sore throat, earache, or congestion. PHYSICAL EXAMINATION:  VITAL SIGNS:  Presently, temp 98, pulse 82, respirations 16, blood  pressure 98/68, pulse ox 93% on room air. GENERAL:  Well-developed, well-nourished elderly man who appears younger  than his stated age.   He is alert and oriented, answers questions  appropriately, is friendly and willing to cooperate with

## 2023-03-23 NOTE — PLAN OF CARE
Problem: Discharge Planning  Goal: Discharge to home or other facility with appropriate resources  3/22/2023 2329 by Anson Hernandez RN  Outcome: Progressing  3/22/2023 2328 by Anson Hernandez RN  Outcome: Progressing

## 2023-03-23 NOTE — CONSULTS
standpoint if no further chest pain  Outpatient follow-up with me    Thank you for the consultation. Please do not hesitate to call with questions.     Sofia Jeff MD, [address] [facility]

## 2023-03-24 ENCOUNTER — TELEPHONE (OUTPATIENT)
Dept: CARDIOLOGY CLINIC | Age: 73
End: 2023-03-24

## 2023-04-04 DIAGNOSIS — R07.9 CHEST PAIN, UNSPECIFIED TYPE: Primary | ICD-10-CM

## 2023-04-04 NOTE — TELEPHONE ENCOUNTER
Contacted patient with recommendations for stress testing per Dr. Ruben Harvey. Patient verbalized understanding. Order for stress test in [facility].

## 2023-04-21 PROBLEM — E86.0 DEHYDRATION: Status: RESOLVED | Noted: 2023-03-22 | Resolved: 2023-04-21

## 2023-04-22 PROBLEM — R79.89 ELEVATED TROPONIN: Status: RESOLVED | Noted: 2023-03-23 | Resolved: 2023-04-22

## 2023-04-22 PROBLEM — R77.8 ELEVATED TROPONIN: Status: RESOLVED | Noted: 2023-03-23 | Resolved: 2023-04-22

## 2023-08-31 ENCOUNTER — TELEPHONE (OUTPATIENT)
Dept: CARDIOLOGY | Age: 73
End: 2023-08-31

## 2023-09-13 ENCOUNTER — TELEPHONE (OUTPATIENT)
Dept: CARDIOLOGY | Age: 73
End: 2023-09-13

## 2023-09-13 NOTE — TELEPHONE ENCOUNTER
Left message on voice mail to remind patient of nuclear stress test appointment on September 15, 2023 at 0700. Instructions for test,, and COVID-19 preprocedure information left on voice mail. Asked patient to call with any questions or if unable to keep appointment.

## 2023-09-14 ENCOUNTER — TELEPHONE (OUTPATIENT)
Dept: CARDIOLOGY | Age: 73
End: 2023-09-14

## 2023-09-15 ENCOUNTER — TELEPHONE (OUTPATIENT)
Dept: CARDIOLOGY | Age: 73
End: 2023-09-15

## 2023-09-15 NOTE — TELEPHONE ENCOUNTER
9/14/23 calls to wife and patient to confirm attendance for 9/15 Stress test. Patient did not present for appointment. No Adolfo.    Agueda 7:30a 9/15/23

## 2024-01-05 ENCOUNTER — TELEPHONE (OUTPATIENT)
Dept: CARDIOLOGY | Age: 74
End: 2024-01-05

## 2024-01-05 NOTE — TELEPHONE ENCOUNTER
Left message for patient to call our office to schedule Nuclear stress test.  Electronically signed by Stacie Elam on 1/5/2024 at 1:36 PM
